# Patient Record
Sex: MALE | Race: WHITE | Employment: FULL TIME | URBAN - METROPOLITAN AREA
[De-identification: names, ages, dates, MRNs, and addresses within clinical notes are randomized per-mention and may not be internally consistent; named-entity substitution may affect disease eponyms.]

---

## 2018-10-22 ENCOUNTER — HOSPITAL ENCOUNTER (OUTPATIENT)
Dept: MRI IMAGING | Age: 63
Discharge: HOME | End: 2018-10-22
Attending: OTOLARYNGOLOGY
Payer: COMMERCIAL

## 2018-10-22 DIAGNOSIS — H90.5 SENSORINEURAL HEARING LOSS (SNHL) OF LEFT EAR: ICD-10-CM

## 2018-10-22 PROCEDURE — 74011250636 HC RX REV CODE- 250/636: Performed by: OTOLARYNGOLOGY

## 2018-10-22 PROCEDURE — A9575 INJ GADOTERATE MEGLUMI 0.1ML: HCPCS | Performed by: OTOLARYNGOLOGY

## 2018-10-22 PROCEDURE — 70553 MRI BRAIN STEM W/O & W/DYE: CPT

## 2018-10-22 RX ORDER — GADOTERATE MEGLUMINE 376.9 MG/ML
20 INJECTION INTRAVENOUS
Status: COMPLETED | OUTPATIENT
Start: 2018-10-22 | End: 2018-10-22

## 2018-10-22 RX ADMIN — GADOTERATE MEGLUMINE 20 ML: 376.9 INJECTION INTRAVENOUS at 08:07

## 2022-04-28 NOTE — PERIOP NOTES
[facility]  Ambulatory Surgery Unit  Pre-operative Instructions    Surgery/Procedure Date  Tuesday May 3rd            Tentative Arrival Time TBD      1. On the day of your surgery/procedure, please report to the Ambulatory Surgery Unit Registration Desk and sign in at your designated time. The Ambulatory Surgery Unit is located in [facility] on the [facility] side of the [facility] across from the [address] [place] [address]. Please have all of your health insurance cards and a photo ID.    **TWO adults may accompany you the day of the procedure. We have limited seating available. If our waiting room is at capacity, your ride may be asked to remain in their vehicle. No one under 15 is allowed in the waiting room. Masks, fully covering the mouth and nose, are required in the waiting room. 2. You must have someone with you to drive you home, as you should not drive a car for 24 hours following anesthesia. Please make arrangements for a responsible adult friend or family member to stay with you for at least the first 24 hours after your surgery. 3. Do not have anything to eat or drink (including water, gum, mints, coffee, juice) after 11:59 PM  Monday May 2nd. This may not apply to medications prescribed by your physician. (Please note below the special instructions with medications to take the morning of surgery, if applicable.)    4. We recommend you do not drink any alcoholic beverages for 24 hours before and after your surgery. 5. Contact your surgeons office for instructions on the following medications: non-steroidal anti-inflammatory drugs (i.e. Advil, Aleve), vitamins, and supplements. (Some surgeons will want you to stop these medications prior to surgery and others may allow you to take them)   **If you are currently taking Plavix, Coumadin, Aspirin and/or other blood-thinning agents, contact your surgeon for instructions. ** Your surgeon will partner with the physician prescribing these medications to determine if it is safe to stop or if you need to continue taking. Please do not stop taking these medications without instructions from your surgeon. 6. In an effort to help prevent surgical site infection, we ask that you shower with an anti-bacterial soap (i.e. Dial/Safeguard, or the soap provided to you at your preadmission testing appointment) for 3 days prior to and on the morning of surgery, using a fresh towel after each shower. (Please begin this process with fresh bed linens.) Do not apply any lotions, powders, or deodorants after the shower on the day of your procedure. If applicable, please do not shave the operative site for 48 hours prior to surgery. 7. Wear comfortable clothes. Wear glasses instead of contacts. Do not bring any jewelry or money (other than copays or fees as instructed). Do not wear make-up, particularly mascara, the morning of your surgery. Do not wear nail polish, particularly if you are having foot /hand surgery. Wear your hair loose or down, no ponytails, buns, olive pins or clips. All body piercings must be removed. 8. You should understand that if you do not follow these instructions your surgery may be cancelled. If your physical condition changes (i.e. fever, cold or flu) please contact your surgeon as soon as possible. 9. It is important that you be on time. If a situation occurs where you may be late, or if you have any questions or problems, please call [phone].    10. Your surgery time may be subject to change. You will receive a phone call the day prior to surgery to confirm your arrival time. 11. Pediatric patients: please bring a change of clothes, diapers, bottle/sippy cup, pacifier, etc.      Special Instructions:     Take all medications and inhalers, as prescribed, on the morning of surgery with a sip of water EXCEPT: n/a      Insulin Dependent Diabetic patients: Take your diabetic medications as prescribed the day before surgery. Hold all diabetic medications the day of surgery. If you are scheduled to arrive for surgery after 8:00 AM, and your AM blood sugar is >200, please call Ambulatory Surgery. I understand a pre-operative phone call will be made to verify my surgery time. In the event that I am not available, I give permission for a message to be left on my answering service and/or with another person?       Yes    Reviewed instructions via telephone, patient verbalized understanding         ___________________      ___________________      ________________  (Signature of Patient)          (Witness)                   (Date and Time)

## 2022-05-02 ENCOUNTER — ANESTHESIA EVENT (OUTPATIENT)
Dept: SURGERY | Age: 67
End: 2022-05-02
Payer: COMMERCIAL

## 2022-05-02 PROBLEM — S46.012A TRAUMATIC COMPLETE TEAR OF LEFT ROTATOR CUFF: Status: ACTIVE | Noted: 2022-05-02

## 2022-05-02 NOTE — H&P
PRE- OP History and Physical                             Subjective:     Patient is a 77 y.o. male presented with a history of L shoulder pain ongoing 3.5 months after a fall landing on his L shoulder. He was initially seen at Ortho on-call treated with diclofenac and physical therapy. Ronal Aden was re-evaluated by Dr. Lamine Blanton 1 month ago and encouraged to continue with therapy. He states that his pain is still persistent and he locates his pain on his lateral deltoid. He notes of weakness in his L shoulder. He notes of night awakening due to pain. He is nondiabetic, nonsmoker. .  The patient's condition has been resistant to non-operative treatment and is being admitted for surgical management of this condition. Patient Active Problem List    Diagnosis Date Noted    Traumatic complete tear of left rotator cuff 05/02/2022     Past Medical History:   Diagnosis Date    Arthritis     Hypertension       Past Surgical History:   Procedure Laterality Date    HX CATARACT REMOVAL Bilateral     with lens implants    HX CHOLECYSTECTOMY      HX COLONOSCOPY      HX HEART CATHETERIZATION      no stents      Prior to Admission medications    Medication Sig Start Date End Date Taking? Authorizing Provider   aspirin delayed-release 81 mg tablet Take 81 mg by mouth daily. Yes Provider, Historical   valsartan (DIOVAN) 160 mg tablet Take 160 mg by mouth daily. Yes Provider, Historical   cetirizine (ZYRTEC) 10 mg tablet Take 10 mg by mouth daily. Yes Provider, Historical     Allergies   Allergen Reactions    Amoxicillin Rash     And itching      Social History     Tobacco Use    Smoking status: Never Smoker    Smokeless tobacco: Never Used   Substance Use Topics    Alcohol use:  Yes     Alcohol/week: 2.0 standard drinks     Types: 2 Cans of beer per week     Comment: every couple of months      Family History   Problem Relation Age of Onset    Hypertension Mother     Diabetes Father       Review of Systems  A comprehensive review of systems was negative except for that written in the HPI. Objective:     Patient Vitals for the past 8 hrs:   BP Temp Pulse Resp SpO2 Height Weight   05/03/22 1040 128/80 -- 80 17 100 % -- --   05/03/22 1006 (!) 158/79 -- -- -- -- -- --   05/03/22 1005 -- 97.8 °F (36.6 °C) 84 18 100 % 5' 7\" (1.702 m) 91.6 kg (202 lb)     Visit Vitals  /80   Pulse 80   Temp 97.8 °F (36.6 °C)   Resp 17   Ht 5' 7\" (1.702 m)   Wt 91.6 kg (202 lb)   SpO2 100%   BMI 31.64 kg/m²     General:  Alert, cooperative, no distress, appears stated age. Head:  Normocephalic, without obvious abnormality, atraumatic. Eyes:  Conjunctivae/corneas clear. PERRL, EOMs intact. Ears:  Normal TMs and external ear canals both ears. Nose: Nares normal. Septum midline. Mucosa normal. No drainage or sinus tenderness. Throat: Lips, mucosa, and tongue normal. Teeth and gums normal.   Neck: Supple, symmetrical, trachea midline, no adenopathy, thyroid: no enlargement/tenderness/nodules, no carotid bruit and no JVD. Back:   Symmetric, no curvature. ROM normal. No CVA tenderness. Lungs:   Clear to auscultation bilaterally. Chest wall:  No tenderness or deformity. Heart:  Regular rate and rhythm, S1, S2, no murmur, click, rub or gallop. Abdomen:   Soft, non-tender. Bowel sounds normal. No masses,  No organomegaly. Extremities: Extremities normal except Previous physical exam of bilateral shoulders shows /160, external rotation 70/70.  Mild painful arc.  Mild positive drop-arm sign.  Positive Kaplan on the left.  He has 4/5 strength on the left compared to 05/05 on the right with forward elevation and external rotation testing.  No AC pain.  Good biceps triceps strength, no biceps deformity. No stability was performed due to MRI already showing complete capsular tear. , atraumatic, no cyanosis or edema. Pulses: 2+ and symmetric all extremities.    Skin: Skin color, texture, turgor normal. No rashes or lesions   Lymph nodes: Cervical, supraclavicular, and axillary nodes normal.   Neurologic: CNII-XII intact. Neurovascular exam intact in distal extremities        Imaging Review          MRI shoulder left without contrast ([id number])     Result Date: 4/21/2022  [facility] MRI INDICATION: Traumatic complete rotator cuff tear, pain COMPARISON: None TECHNIQUE: Axial proton density fat-saturated; oblique coronal T1, T2 fat-saturated, and proton density fat-saturated; and oblique sagittal T2 fat-saturated MRI of the left shoulder. CONTRAST: None. FINDINGS: A.C. joint: Moderate osteoarthritis. Anterior acromion process type: 2 Bone marrow: No acute fracture, dislocation, or marrow replacing process. Scattered subcortical cystic change in the humeral head Joint fluid: Moderate size joint effusion with synovitis. Moderate size subacromial/subdeltoid bursal effusion Rotator cuff tendons: Full-thickness, full width supraspinatus tendon tear extending to the superior subscapularis and infraspinatus tendon fibers with retraction up to the mid humeral head level. Background marked subscapularis, marked supraspinatus, and moderate infraspinatus tendinosis. Biceps tendon: Marked intracapsular tendinosis. Partial tear at the genu. Mild medial subluxation. Muscles: Feathery edema signal within supraspinatus superiorly with contour irregularity, concerning for partial tear Glenoid labrum: Intact. Joint capsule: Humeral avulsion of the inferior glenohumeral ligament. Glenohumeral articular cartilage: Signal heterogeneity. No focal osteochondral lesion Soft tissue mass: None. IMPRESSION: 1.   Full-thickness, full width supraspinatus tendon tear extending to subscapularis and infraspinatus, with retraction. Partial tear of the supraspinatus muscle. 2.   Humeral avulsion of the inferior glenohumeral ligament. 3.   Marked long head biceps tendinosis with partial tear and mild subluxation.  Danny Fatima     I have personally and independently reviewed MRI and the report of L shoulder on 4/21/2022, which reveals full thickness rotator cuff tear of the supraspinatus, subscapularis and infraspinatus with retraction, biceps instability and tearing, and capsular tear the needs to be repaired. Assessment:     Active Problems:    Traumatic complete tear of left rotator cuff (5/2/2022)        Plan:   Nabil Hi has rotator cuff tear and biceps instability on his L shoulder. I discussed the natural history and natural progression of diagnoses. I reviewed the patient's medical record, previous office notes, and  discussed findings, imaging, impression, treatment options, and plan with the patient. Treatment options discussed to include no intervention, prescription strength oral anti-inflammatories, cortisone injections, oral steroids, therapy, MRI, and surgery.      This is an ongoing problem. MRI shows full thickness rotator cuff tear of the supraspinatus to the subscapularis and infraspinatus with retraction. He also has a HAGL lesion which is a traumatic capsular tear so he obviously suffered a dislocation with his fall and will require capsular repair with his cuff repair. Previous conservative treatment of NSAIDs and physical therapy for 6 weeks has failed to provide relief. Surgical intervention was discussed and includes arthroscopic rotator cuff repair 3x, capsular repair, and biceps tenodesis. Pros, cons, success rate and recovery time of surgery was discussed.      After discussion and answering questions, it was elected to proceed with arthroscopic rotator cuff repair 3x, capsular repair, and biceps tenodesis. Handout on surgery was filled. Follow up for surgery. Operative and non-operative treatments have been discussed with the patient including risks and benefits of each. After consideration of risks, benefits limitations to the consented procedures and alternative options for treatment, the patient has consented to surgical interventions. Questions were answered and Pre-op teaching was completed.       COREY Alexandra

## 2022-05-03 ENCOUNTER — HOSPITAL ENCOUNTER (OUTPATIENT)
Age: 67
Setting detail: OUTPATIENT SURGERY
Discharge: HOME | End: 2022-05-03
Attending: ORTHOPAEDIC SURGERY | Admitting: ORTHOPAEDIC SURGERY
Payer: COMMERCIAL

## 2022-05-03 ENCOUNTER — ANESTHESIA (OUTPATIENT)
Dept: SURGERY | Age: 67
End: 2022-05-03
Payer: COMMERCIAL

## 2022-05-03 VITALS
DIASTOLIC BLOOD PRESSURE: 72 MMHG | SYSTOLIC BLOOD PRESSURE: 123 MMHG | HEART RATE: 85 BPM | OXYGEN SATURATION: 94 % | TEMPERATURE: 98.3 F | WEIGHT: 202 LBS | BODY MASS INDEX: 31.71 KG/M2 | HEIGHT: 67 IN | RESPIRATION RATE: 19 BRPM

## 2022-05-03 DIAGNOSIS — S46.012A TRAUMATIC COMPLETE TEAR OF LEFT ROTATOR CUFF, INITIAL ENCOUNTER: Primary | ICD-10-CM

## 2022-05-03 PROCEDURE — 77030010509 HC AIRWY LMA MSK TELE -A: Performed by: NURSE ANESTHETIST, CERTIFIED REGISTERED

## 2022-05-03 PROCEDURE — 74011250636 HC RX REV CODE- 250/636: Performed by: ORTHOPAEDIC SURGERY

## 2022-05-03 PROCEDURE — 77030002922 HC SUT FBRWRE ARTH -B: Performed by: ORTHOPAEDIC SURGERY

## 2022-05-03 PROCEDURE — C1713 ANCHOR/SCREW BN/BN,TIS/BN: HCPCS | Performed by: ORTHOPAEDIC SURGERY

## 2022-05-03 PROCEDURE — 77030018834: Performed by: ORTHOPAEDIC SURGERY

## 2022-05-03 PROCEDURE — 77030012711 HC WND ARTHRO ABLT S&N -D: Performed by: ORTHOPAEDIC SURGERY

## 2022-05-03 PROCEDURE — 77030008496 HC TBNG ARTHSC IRR S&N -B: Performed by: ORTHOPAEDIC SURGERY

## 2022-05-03 PROCEDURE — 74011000250 HC RX REV CODE- 250: Performed by: NURSE ANESTHETIST, CERTIFIED REGISTERED

## 2022-05-03 PROCEDURE — 77030037837: Performed by: ORTHOPAEDIC SURGERY

## 2022-05-03 PROCEDURE — 76030000003 HC AMB SURG OR TIME 1.5 TO 2: Performed by: ORTHOPAEDIC SURGERY

## 2022-05-03 PROCEDURE — 74011250636 HC RX REV CODE- 250/636: Performed by: ANESTHESIOLOGY

## 2022-05-03 PROCEDURE — 77030019908 HC STETH ESOPH SIMS -A: Performed by: NURSE ANESTHETIST, CERTIFIED REGISTERED

## 2022-05-03 PROCEDURE — 74011000250 HC RX REV CODE- 250: Performed by: ORTHOPAEDIC SURGERY

## 2022-05-03 PROCEDURE — 77030003598 HC NDL MULT/FIRE ARTH -C: Performed by: ORTHOPAEDIC SURGERY

## 2022-05-03 PROCEDURE — 77030013079 HC BLNKT BAIR HGGR 3M -A: Performed by: NURSE ANESTHETIST, CERTIFIED REGISTERED

## 2022-05-03 PROCEDURE — 77030002916 HC SUT ETHLN J&J -A: Performed by: ORTHOPAEDIC SURGERY

## 2022-05-03 PROCEDURE — 76210000050 HC AMBSU PH II REC 0.5 TO 1 HR: Performed by: ORTHOPAEDIC SURGERY

## 2022-05-03 PROCEDURE — 77030034038 HC BLD SHV DYON ACRMNZR S&N -B: Performed by: ORTHOPAEDIC SURGERY

## 2022-05-03 PROCEDURE — 77030004451 HC BUR SHV S&N -B: Performed by: ORTHOPAEDIC SURGERY

## 2022-05-03 PROCEDURE — 76060000063 HC AMB SURG ANES 1.5 TO 2 HR: Performed by: ORTHOPAEDIC SURGERY

## 2022-05-03 PROCEDURE — 76210000040 HC AMBSU PH I REC FIRST 0.5 HR: Performed by: ORTHOPAEDIC SURGERY

## 2022-05-03 PROCEDURE — 2709999900 HC NON-CHARGEABLE SUPPLY: Performed by: ORTHOPAEDIC SURGERY

## 2022-05-03 PROCEDURE — 77030002966 HC SUT PDS J&J -A: Performed by: ORTHOPAEDIC SURGERY

## 2022-05-03 PROCEDURE — 74011250636 HC RX REV CODE- 250/636: Performed by: NURSE ANESTHETIST, CERTIFIED REGISTERED

## 2022-05-03 DEVICE — ANCHOR SUTURE SFT 2.6 MM TRPL LD FIBERWIRE CL FIBERTAK: Type: IMPLANTABLE DEVICE | Site: SHOULDER | Status: FUNCTIONAL

## 2022-05-03 RX ORDER — SODIUM CHLORIDE 0.9 % (FLUSH) 0.9 %
5-40 SYRINGE (ML) INJECTION AS NEEDED
Status: DISCONTINUED | OUTPATIENT
Start: 2022-05-03 | End: 2022-05-03 | Stop reason: HOSPADM

## 2022-05-03 RX ORDER — GABAPENTIN 300 MG/1
300 CAPSULE ORAL
Qty: 3 CAPSULE | Refills: 0 | Status: SHIPPED | OUTPATIENT
Start: 2022-05-03

## 2022-05-03 RX ORDER — DIPHENHYDRAMINE HYDROCHLORIDE 50 MG/ML
12.5 INJECTION, SOLUTION INTRAMUSCULAR; INTRAVENOUS AS NEEDED
Status: DISCONTINUED | OUTPATIENT
Start: 2022-05-03 | End: 2022-05-03 | Stop reason: HOSPADM

## 2022-05-03 RX ORDER — PROPOFOL 10 MG/ML
INJECTION, EMULSION INTRAVENOUS AS NEEDED
Status: DISCONTINUED | OUTPATIENT
Start: 2022-05-03 | End: 2022-05-03 | Stop reason: HOSPADM

## 2022-05-03 RX ORDER — OXYCODONE HYDROCHLORIDE 5 MG/1
5 TABLET ORAL
Qty: 30 TABLET | Refills: 0 | Status: SHIPPED | OUTPATIENT
Start: 2022-05-03 | End: 2022-05-08

## 2022-05-03 RX ORDER — LIDOCAINE HYDROCHLORIDE 10 MG/ML
0.1 INJECTION, SOLUTION EPIDURAL; INFILTRATION; INTRACAUDAL; PERINEURAL AS NEEDED
Status: DISCONTINUED | OUTPATIENT
Start: 2022-05-03 | End: 2022-05-03 | Stop reason: HOSPADM

## 2022-05-03 RX ORDER — MIDAZOLAM HYDROCHLORIDE 1 MG/ML
INJECTION, SOLUTION INTRAMUSCULAR; INTRAVENOUS
Status: COMPLETED
Start: 2022-05-03 | End: 2022-05-03

## 2022-05-03 RX ORDER — HYDROMORPHONE HYDROCHLORIDE 1 MG/ML
.2-.5 INJECTION, SOLUTION INTRAMUSCULAR; INTRAVENOUS; SUBCUTANEOUS ONCE
Status: DISCONTINUED | OUTPATIENT
Start: 2022-05-03 | End: 2022-05-03 | Stop reason: HOSPADM

## 2022-05-03 RX ORDER — MORPHINE SULFATE 10 MG/ML
2 INJECTION, SOLUTION INTRAMUSCULAR; INTRAVENOUS
Status: DISCONTINUED | OUTPATIENT
Start: 2022-05-03 | End: 2022-05-03 | Stop reason: HOSPADM

## 2022-05-03 RX ORDER — ROPIVACAINE HYDROCHLORIDE 5 MG/ML
INJECTION, SOLUTION EPIDURAL; INFILTRATION; PERINEURAL
Status: COMPLETED
Start: 2022-05-03 | End: 2022-05-03

## 2022-05-03 RX ORDER — SODIUM CHLORIDE, SODIUM LACTATE, POTASSIUM CHLORIDE, CALCIUM CHLORIDE 600; 310; 30; 20 MG/100ML; MG/100ML; MG/100ML; MG/100ML
25 INJECTION, SOLUTION INTRAVENOUS CONTINUOUS
Status: DISCONTINUED | OUTPATIENT
Start: 2022-05-03 | End: 2022-05-03 | Stop reason: HOSPADM

## 2022-05-03 RX ORDER — LIDOCAINE HYDROCHLORIDE 20 MG/ML
INJECTION, SOLUTION EPIDURAL; INFILTRATION; INTRACAUDAL; PERINEURAL AS NEEDED
Status: DISCONTINUED | OUTPATIENT
Start: 2022-05-03 | End: 2022-05-03 | Stop reason: HOSPADM

## 2022-05-03 RX ORDER — ROPIVACAINE HYDROCHLORIDE 5 MG/ML
INJECTION, SOLUTION EPIDURAL; INFILTRATION; PERINEURAL
Status: COMPLETED | OUTPATIENT
Start: 2022-05-03 | End: 2022-05-03

## 2022-05-03 RX ORDER — PHENYLEPHRINE HCL IN 0.9% NACL 0.4MG/10ML
SYRINGE (ML) INTRAVENOUS AS NEEDED
Status: DISCONTINUED | OUTPATIENT
Start: 2022-05-03 | End: 2022-05-03 | Stop reason: HOSPADM

## 2022-05-03 RX ORDER — DEXAMETHASONE SODIUM PHOSPHATE 4 MG/ML
INJECTION, SOLUTION INTRA-ARTICULAR; INTRALESIONAL; INTRAMUSCULAR; INTRAVENOUS; SOFT TISSUE AS NEEDED
Status: DISCONTINUED | OUTPATIENT
Start: 2022-05-03 | End: 2022-05-03 | Stop reason: HOSPADM

## 2022-05-03 RX ORDER — KETOROLAC TROMETHAMINE 10 MG/1
10 TABLET, FILM COATED ORAL EVERY 6 HOURS
Qty: 12 TABLET | Refills: 0 | Status: SHIPPED | OUTPATIENT
Start: 2022-05-03 | End: 2022-05-06

## 2022-05-03 RX ORDER — FENTANYL CITRATE 50 UG/ML
25 INJECTION, SOLUTION INTRAMUSCULAR; INTRAVENOUS
Status: DISCONTINUED | OUTPATIENT
Start: 2022-05-03 | End: 2022-05-03 | Stop reason: HOSPADM

## 2022-05-03 RX ORDER — MIDAZOLAM HYDROCHLORIDE 1 MG/ML
INJECTION, SOLUTION INTRAMUSCULAR; INTRAVENOUS AS NEEDED
Status: DISCONTINUED | OUTPATIENT
Start: 2022-05-03 | End: 2022-05-03 | Stop reason: HOSPADM

## 2022-05-03 RX ORDER — SODIUM CHLORIDE 0.9 % (FLUSH) 0.9 %
5-40 SYRINGE (ML) INJECTION EVERY 8 HOURS
Status: DISCONTINUED | OUTPATIENT
Start: 2022-05-03 | End: 2022-05-03 | Stop reason: HOSPADM

## 2022-05-03 RX ORDER — EPHEDRINE SULFATE/0.9% NACL/PF 50 MG/5 ML
SYRINGE (ML) INTRAVENOUS AS NEEDED
Status: DISCONTINUED | OUTPATIENT
Start: 2022-05-03 | End: 2022-05-03 | Stop reason: HOSPADM

## 2022-05-03 RX ORDER — ONDANSETRON 2 MG/ML
INJECTION INTRAMUSCULAR; INTRAVENOUS AS NEEDED
Status: DISCONTINUED | OUTPATIENT
Start: 2022-05-03 | End: 2022-05-03 | Stop reason: HOSPADM

## 2022-05-03 RX ORDER — OXYCODONE AND ACETAMINOPHEN 5; 325 MG/1; MG/1
1 TABLET ORAL
Status: DISCONTINUED | OUTPATIENT
Start: 2022-05-03 | End: 2022-05-03 | Stop reason: HOSPADM

## 2022-05-03 RX ORDER — DROPERIDOL 2.5 MG/ML
0.62 INJECTION, SOLUTION INTRAMUSCULAR; INTRAVENOUS AS NEEDED
Status: DISCONTINUED | OUTPATIENT
Start: 2022-05-03 | End: 2022-05-03 | Stop reason: HOSPADM

## 2022-05-03 RX ORDER — FENTANYL CITRATE 50 UG/ML
INJECTION, SOLUTION INTRAMUSCULAR; INTRAVENOUS AS NEEDED
Status: DISCONTINUED | OUTPATIENT
Start: 2022-05-03 | End: 2022-05-03 | Stop reason: HOSPADM

## 2022-05-03 RX ORDER — ONDANSETRON 4 MG/1
4 TABLET, FILM COATED ORAL
Qty: 10 TABLET | Refills: 1 | Status: SHIPPED | OUTPATIENT
Start: 2022-05-03

## 2022-05-03 RX ADMIN — SODIUM CHLORIDE, POTASSIUM CHLORIDE, SODIUM LACTATE AND CALCIUM CHLORIDE 25 ML/HR: 600; 310; 30; 20 INJECTION, SOLUTION INTRAVENOUS at 10:12

## 2022-05-03 RX ADMIN — PROPOFOL 160 MG: 10 INJECTION, EMULSION INTRAVENOUS at 11:00

## 2022-05-03 RX ADMIN — FENTANYL CITRATE 25 MCG: 50 INJECTION, SOLUTION INTRAMUSCULAR; INTRAVENOUS at 12:16

## 2022-05-03 RX ADMIN — DEXAMETHASONE SODIUM PHOSPHATE 4 MG: 4 INJECTION, SOLUTION INTRAMUSCULAR; INTRAVENOUS at 11:10

## 2022-05-03 RX ADMIN — Medication 40 MCG: at 11:17

## 2022-05-03 RX ADMIN — ROPIVACAINE HYDROCHLORIDE 30 ML: 5 INJECTION, SOLUTION EPIDURAL; INFILTRATION; PERINEURAL at 11:38

## 2022-05-03 RX ADMIN — Medication 10 MG: at 11:11

## 2022-05-03 RX ADMIN — WATER 2 G: 1 INJECTION INTRAMUSCULAR; INTRAVENOUS; SUBCUTANEOUS at 11:05

## 2022-05-03 RX ADMIN — Medication 10 MG: at 11:23

## 2022-05-03 RX ADMIN — Medication 40 MCG: at 11:21

## 2022-05-03 RX ADMIN — Medication 80 MCG: at 11:29

## 2022-05-03 RX ADMIN — ONDANSETRON HYDROCHLORIDE 4 MG: 2 INJECTION, SOLUTION INTRAMUSCULAR; INTRAVENOUS at 11:10

## 2022-05-03 RX ADMIN — Medication 80 MCG: at 11:34

## 2022-05-03 RX ADMIN — FENTANYL CITRATE 25 MCG: 50 INJECTION, SOLUTION INTRAMUSCULAR; INTRAVENOUS at 12:36

## 2022-05-03 RX ADMIN — MIDAZOLAM HYDROCHLORIDE 3 MG: 1 INJECTION, SOLUTION INTRAMUSCULAR; INTRAVENOUS at 11:32

## 2022-05-03 RX ADMIN — SODIUM CHLORIDE 40 MCG/MIN: 900 INJECTION, SOLUTION INTRAVENOUS at 11:31

## 2022-05-03 RX ADMIN — LIDOCAINE HYDROCHLORIDE 60 MG: 20 INJECTION, SOLUTION EPIDURAL; INFILTRATION; INTRACAUDAL; PERINEURAL at 11:00

## 2022-05-03 RX ADMIN — FENTANYL CITRATE 50 MCG: 50 INJECTION, SOLUTION INTRAMUSCULAR; INTRAVENOUS at 11:00

## 2022-05-03 RX ADMIN — Medication 80 MCG: at 11:31

## 2022-05-03 RX ADMIN — SODIUM CHLORIDE, POTASSIUM CHLORIDE, SODIUM LACTATE AND CALCIUM CHLORIDE: 600; 310; 30; 20 INJECTION, SOLUTION INTRAVENOUS at 12:50

## 2022-05-03 RX ADMIN — Medication 80 MCG: at 11:23

## 2022-05-03 NOTE — PERIOP NOTES
Bebo Hall  [birth year]  [id number]    Situation:  Verbal report given from: Jack Flores RN and Roge Cruz, MOHINDER  Procedure: Procedure(s):  LEFT SHOULDER ARTHROSCOPY, ROTATOR CUFF REPAIR, BICEPS TENODESIS    Background:    Preoperative diagnosis: LEFT ROTATOR CUFF TEAR    Postoperative diagnosis: LEFT ROTATOR CUFF TEAR    :  Dr. Radha Cesar    Assistant(s): Circ-1: Gabriel Pringle RN  Physician Assistant: COREY Yeung  Scrub Tech-1: Leidy MARTINEZ, RT    Specimens: * No specimens in log *    Assessment:  Intra-procedure medications         Anesthesia gave intra-procedure sedation and medications, see anesthesia flow sheet     Intravenous fluids: LR@ KVO     Vital signs stable       Recommendation:    Permission to share finding with wife Roberta Bird

## 2022-05-03 NOTE — PERIOP NOTES
Patient received to PACU, VSS. Patient drowsy but arouses to voice. Dressing to left shoulder intact. 1302: Patient awake tolerating liquids, patient rates pain 0/10. Discharge instructions given. Patient and wife Cari Prather verbalize understanding of instructions and follow up appointment. 1340: Patient assisted to restroom to void without issue. Patient and wife state ready for discharge. IV removed. Sling applied. Patient discharged at this time by wheelchair with belongings, wife to provide transportation home.

## 2022-05-03 NOTE — OP NOTES
Name: Jeanette Venegas MRN: [id number]    : 1955    Surgery Date: 5/3/2022     Date of Dictation: 5/3/2022    Admitting DX: LEFT ROTATOR CUFF TEAR    Pre-OP DX:  Left rotator cuff tear x3, biceps instabiity, HAGL lesion    Post OP DX: same    Procedure: Left shoulder arthroscopic Rotator cuff tear x3 supra, infra, subscap, biceps tenodesis    Surgeon: Fernandez Patino M.D. Asst: CECELIA Neri Complications: none    Blood Loss: Minimal    Implants: Arthrex anchores    Antibiotics: Weight appropriate IV Ancef    Specimens: none    Anesthesia: GET with Interscalene Block    Indication: This patient has a symptomatic rotator cuff tear that comes in for repair. Patient has failed nonsurgical treatment. This is a complex surgical procedure requiring an assistant for patient positioning, for wound closure, but critically for what is a 4 handed operation requiring the assistant to hold the arthroscope while anchors are placed while sutures are thrown through the rotator cuff while knots are tied, for cannula stabilization and for suture management and an assistant is used. Procedure:  Patient is brought into the operative room theater and rolled over into the left side up position, beanbag X of plated, downside axillary roll placed, downside peroneal nerve padded, neck placed in neutral extension position the shoulders then prepped and draped in put in 10 lb of traction and 30 degrees of abduction and 20 degrees of forward flexion. After time out anterior and posterior arthroscopic portals were established in the glenohumeral joint was viewed in its entirety. The findings were as follows. Articular cartilage normal. Labrum normal. Biceps torn and medially unstable due to subscap tear, tenotomized and tenodesed to transverse bicipital ligament with luggage tag suture technique, proximal stump removed. Epifanio Johnson Rotator Cuff full thickness 3 tendon tear    We then left the glenohumeral space and entered the subacromial space. The rotator cuff tear was visualized, mobilized, the edges were freshened, the underlying tuberosity was roughened. The characteristics of this tear are good cuff quality, everything looks acute. This rotator cuff repair was repaired using a two  row technique with Arthrex anchors, sutures placed using a scorpion through a lateral passport. 6 triple and double loaded anchors were used. All knots were tied using over over under post switch over post which under knot tying technique using a knot tyer while my assistant held the scope. NO SAD needed. The hagl was fixed during repair of subscap by grabbing both lateral tendon and capsule with those sutures. Photographs were taken, the joint was copiously irrigated, portals were closed and a sterile dressing applied, patient taken to the recovery room in a sling in stable condition.   End dictation

## 2022-05-03 NOTE — DISCHARGE INSTRUCTIONS
Yohana Styles M.D. Magdi Muñoz PA-C  Knee & Shoulder Surgery  Sports Medicine  [phone]     Rotator Cuff Repair Post-Operative Instructions      Activity Restrictions:  No lifting your operative arm. You may extend and bend your elbow and you may also move your wrist. You may perform minimal arm dangle exercises for personal hygiene. _X__ You had a long head biceps tendon repair. Avoid supination activities such as screwdrivers, door knobs, or using a can opener for the next 6 weeks. Pain Control: We manage post-operative pain with a combination of tools including the nerve block, ice, Tylenol, Toradol, Gabapentin and pain medicine. We encourage you to use Tylenol, 1000 mg every 6 hours, only for the first 5 days. After that use only 500 mg every 6 hours. You will use Toradol 10mg every 6 hours for the first 3 days only. Gabapentin 300mg is used only at night for the first 3 nights. We prescribe pain medicine, typically oxycodone unless you have a known adverse reaction and you will use this as needed, mostly the first 3 days and generally no more than 1 week. Sling: You will use your sling for 5 weeks. You may remove your arm from the sling for showers. You may also remove your arm from the sling and let your arm hang down so your elbow doesnt get stiff. You are encouraged to perform hand, wrist and elbow range of motion exercises, as well as shoulder blade pinches, 4-5 times per day. These exercises will help decrease swelling and stiffness in the elbow and wrist. No dangle exercises until 2 weeks after surgery. The essential point is that you are NOT allowed to lift your elbow away from your side under its own power for the first 5 weeks. Dressing: Your dressing will be left in place for 48 hours from your surgery time. You may notice bloody drainage on the dressing.   That is normal.  You will remove the dressing 2 days after surgery and cover the incisions with circular band-aids. Shower with band-aids on, then remove and replace the band-aids after showers. Sutures will be removed at your first post-op appointment about 1 week after surgery. Swelling:  Swelling of the biceps, forearm, and hand is very common after surgery. You may even notice some bruising or discoloration of skin. This is normal.  You may use a stress ball and by performing motion of the wrist and elbow the swelling will dissipate. For severe swelling we may arrange for hand therapy if needed. Sleeping:   Patients are generally more comfortable sleeping in a reclining chair or in bed with pillows propped behind the shoulder. You can wear your sling when sleeping, or you may remove the sling and just slide a bed pillow underneath your arm and sleep like that. Some difficulty sleeping is common for 2-3 weeks after surgery. Therapy:    A PT prescription is typically provided in your post-op packet. We will discuss when to start therapy at your first post-op appointment. Your physical therapist will progress your activity appropriately according to our post-op protocol. Medications: You should resume your daily medication for other medical conditions the day after surgery. You will go home with a pain medication prescription, typically Oxycodone unless you have an allergy to this medicine. If you have an adverse reaction to the pain medicine call [phone] or after business hours [phone]. DO NOT wait until you are in a lot of pain before taking the medication. It takes the medication 30-45 minutes to take effect. When using Tylenol or acetaminophen for the first 5 days you may use 1000 mg every 6 hours. After the first 5 days reduce to 500 mg every 6 hours. The use of narcotics can lead to constipation. A high fiber diet and lots of fluids can prevent this occurring.   Over the counter laxatives can be used as directed on the label. We recommend taking both Miralax and Pericolace to help with constipation. If a refill of medication is needed, please call the office during regular business hours, Monday through Friday 8:00 a.m. to 5:00 p.m. Dr. Bereket Porter may not readily available to sign a prescription so it is important to call ahead. Refills will not be made after hours, so please plan ahead. We also cannot guarantee a same day prescription. Severe Pain Dosage Schedule: If you are experiencing severe pain, you may follow this dosage schedule. Example for Severe Pain  12:00 pm Take 2 tablets of narcotic pain medicine, also take Tylenol 1000mg Toradol 10mg   1:00 pm  Take 1 more pain tablet only if in severe pain   2:00 pm  Take 1 more pain tablet only if in severe pain   4:00 pm  Repeat pain medicine as prescribed   6:00 pm  Tylenol 1000mg   8:00 pm  Repeat pain medicine as prescribed, Take Gabapentin 300mg    Severe pain medicine dosing ONLY for the FIRST 24-48 hours    Itching:  Itching/rash is a common post op complaint. It can be caused from many different perioperative causes including but not limited to pain medicine, adhesive tapes, and surgical skin preparations. If you experience moderate to severe itching we recommend using over-the-counter oral Zyrtec (cetrizine) and/or Benadryl as directed on the package. Driving: DO NOT drive with narcotics in your system. You may begin driving 1 week after surgery. It is OK to drive in your sling, just follow the same rules. No lifting your elbow away from your side. Return to school/work: This depends on your job requirements and level of activity. If you work at a desk job or in a supervisory role, you may return as early as 3-4 days after surgery. Average return to physical labor is 4-6 months post-op. Follow Up: You will be given an appointment card for your follow up appointment at our [facility].   At that time your sutures will be removed and physical therapy will be arranged if necessary. IF PROBLEMS ARISE, PLEASE CALL THE OFFICE AT [phone]         DO NOT TAKE TYLENOL/ACETAMINOPHEN WITH PERCOCET, LORTAB, [address] OR VICODEN. TAKE NARCOTIC PAIN MEDICATIONS WITH FOOD! For the night of surgery, while block is still in effect, start with 1 pain pill at bedtime    Narcotics tend to be constipating and we recommend taking a stool softener such as Colace or Miralax (follow package instructions). If you were given prescriptions, please review the written information on the prescribed medications. DO NOT DRIVE WHILE TAKING NARCOTIC PAIN MEDICATIONS. CPAP PATIENTS BE SURE TO WEAR MACHINE WHENEVER NAPPING OR SLEEPING DAY/NIGHT OF SURGERY! DISCHARGE SUMMARY from Nurse    The following personal items collected during your admission are returned to you:   Dental Appliance: Dental Appliances: None  Vision: Visual Aid: Glasses,With patient (in pocket of pants)  Hearing Aid:    Jewelry: Jewelry: None  Clothing: Clothing: With patient  Other Valuables: Other Valuables: With patient,Eyeglasses,Other (comment) (Bilat hearing aids and glasses in pocket of pants)  Valuables sent to safe:        PATIENT INSTRUCTIONS:    After General Anesthesia or Intravenous Sedation, for 24 hours or while taking prescription Narcotics:  · Someone should be with you for the next 24 hours. · For your own safety, a responsible adult must drive you home. · Limit your activities  · Recommended activity: Rest today, up with assistance today. Do not climb stairs or shower unattended for the next 24 hours. · Start with a soft bland diet and advance as tolerated (no nausea) to regular diet. · If you have a sore throat some things that may help are: fluids, warm salt water gargle, or throat lozenges. If this does not improve after several days please follow up with your family physician.   · Do not drive and operate hazardous machinery  · Do not make important personal or business decisions  · Do  not drink alcoholic beverages  · If you have not urinated within 8 hours after discharge, please contact your surgeon on call. Report the following to your surgeon:  · Excessive pain, swelling, redness or odor of or around the surgical area  · Temperature over 100.5  · Nausea and vomiting lasting longer than 4 hours or if unable to take medications  · Any signs of decreased circulation or nerve impairment to extremity: change in color, persistent  numbness, tingling, coldness or increase pain    · If you received an upper extremity nerve block, please wear your sling until the block has worn off, then refer to your surgeons post-operative instructions. If you have had a shoulder block or a block near your collar bone, you may have              symptoms such as:          1. Mild shortness of breath        2. A hoarse voice        3. Blurry vision        4. Unequal pupils        5. Drooping of your face on the same side as the nerve block. These symptoms will disappear as the nerve block wears off. · You will receive a Post Operative Call from one of the Recovery Room Nurses on the day after your surgery to check on you. It is very important for us to know how you are recovering after your surgery. If you have an issue please call your surgeon, do not wait for the post operative call. · You may receive an e-mail or letter in the mail from [employer] regarding your experience with us in the Ambulatory Surgery Unit. Your feedback is valuable to us and we appreciate your participation in the survey. ·   · If the above instructions are not adequate or you are having problems after your surgery, call your physician at their office number. ·   · We wish you a speedy recovery ? What to do at Home:    *  Please give a list of your current medications to your Primary Care Provider.     *  Please update this list whenever your medications are discontinued, doses are      changed, or new medications (including over-the-counter products) are added. *  Please carry medication information at all times in case of emergency situations. If you have not had your influenza or pneumococcal vaccines, please follow up with your primary care physician. The discharge information has been reviewed with the patient and caregiver. The patient and caregiver verbalized understanding.

## 2022-05-03 NOTE — PERIOP NOTES
Dr. Ivan Varma performed a LUE block with ultrasound. Patient on continuous cardiac and pulse oximetry monitoring throughout the procedure, nasal cannula 3 liters. Patient received 3 mg, Versed, IV, administered by Dr. Jessica Sims. Vital signs stable. Patient tolerated procedure well. Patient drowsy but arouses when spoken to. Will continue to monitor.

## 2022-05-03 NOTE — PERIOP NOTES
Permission received to review discharge instructions and discuss private health information with wife, Ricki Andersen. Patient states that wife will be with them for at least 24 hours following today's procedure. Mistral-Air warming blanket applied at this time. Set to appropriate setting that is comfortable to patient. Will continue to monitor.

## 2022-05-03 NOTE — ANESTHESIA POSTPROCEDURE EVALUATION
Procedure(s):  LEFT SHOULDER ARTHROSCOPY, ROTATOR CUFF REPAIR, BICEPS TENODESIS. general, regional    Anesthesia Post Evaluation      Multimodal analgesia: multimodal analgesia used between 6 hours prior to anesthesia start to PACU discharge  Patient location during evaluation: PACU  Patient participation: complete - patient participated  Level of consciousness: awake and alert  Pain score: 0  Airway patency: patent  Anesthetic complications: no  Cardiovascular status: acceptable  Respiratory status: acceptable  Hydration status: acceptable  Comments: Pt has interscalene block. Sling postop.   Post anesthesia nausea and vomiting:  none  Final Post Anesthesia Temperature Assessment:  Normothermia (36.0-37.5 degrees C)      INITIAL Post-op Vital signs:   Vitals Value Taken Time   /69 05/03/22 1300   Temp 36.8 °C (98.3 °F) 05/03/22 1300   Pulse 78 05/03/22 1300   Resp 12 05/03/22 1300   SpO2 94 % 05/03/22 1300

## 2022-05-03 NOTE — ANESTHESIA PREPROCEDURE EVALUATION
Relevant Problems   No relevant active problems       Anesthetic History   No history of anesthetic complications            Review of Systems / Medical History  Patient summary reviewed, nursing notes reviewed and pertinent labs reviewed    Pulmonary  Within defined limits                 Neuro/Psych   Within defined limits           Cardiovascular    Hypertension              Exercise tolerance: >4 METS  Comments: Cath 2013 negative   GI/Hepatic/Renal  Within defined limits              Endo/Other        Arthritis     Other Findings   Comments: Left rotator cuff tear         Physical Exam    Airway  Mallampati: I  TM Distance: > 6 cm  Neck ROM: normal range of motion   Mouth opening: Normal     Cardiovascular    Rhythm: regular  Rate: normal         Dental  No notable dental hx       Pulmonary  Breath sounds clear to auscultation               Abdominal  GI exam deferred       Other Findings            Anesthetic Plan    ASA: 2  Anesthesia type: general and regional - interscalene block      Post-op pain plan if not by surgeon: peripheral nerve block single    Induction: Intravenous  Anesthetic plan and risks discussed with: Patient

## 2022-05-03 NOTE — ANESTHESIA PROCEDURE NOTES
Peripheral Block    Start time: 5/3/2022 11:29 AM  End time: 5/3/2022 11:42 AM  Performed by: Chetan Mcgovern MD  Authorized by: Chetan Mcgovern MD       Pre-procedure: Indications: at surgeon's request and post-op pain management    Preanesthetic Checklist: patient identified, risks and benefits discussed, site marked, timeout performed, anesthesia consent given and patient being monitored    Timeout Time: 11:31 EDT          Block Type:   Block Type:   Interscalene  Laterality:  Left  Monitoring:  Standard ASA monitoring, responsive to questions and oxygen  Injection Technique:  Single shot  Procedures: ultrasound guided    Patient Position: supine  Prep: chlorhexidine    Location:  Interscalene  Needle Type:  Stimuplex  Needle Gauge:  22 G  Needle Localization:  Ultrasound guidance  Medication Injected:  Ropivacaine (PF) (NAROPIN)(0.5%) 5 mg/mL injection, 30 mL  Med Admin Time: 5/3/2022 11:38 AM    Assessment:  Number of attempts:  1  Injection Assessment:  Incremental injection every 5 mL, no paresthesia, ultrasound image on chart, local visualized surrounding nerve on ultrasound, negative aspiration for blood and no intravascular symptoms  Patient tolerance:  Patient tolerated the procedure well with no immediate complications

## 2022-05-03 NOTE — BRIEF OP NOTE
Brief Postoperative Note    Patient: Jignesh Jackson  YOB: 1955  MRN: [id number]    Date of Procedure: 5/3/2022     Pre-Op Diagnosis: LEFT ROTATOR CUFF TEAR    Post-Op Diagnosis: Same as preoperative diagnosis. Procedure(s):  LEFT SHOULDER ARTHROSCOPY, ROTATOR CUFF REPAIR, BICEPS TENODESIS    Surgeon(s):  Maribel Harvey MD    Surgical Assistant: Physician Assistant: COREY Francis    Anesthesia: General     Estimated Blood Loss (mL): Minimal    Complications: None    Specimens: * No specimens in log *     Implants:   Implant Name Type Inv.  Item Serial No.  Lot No. LRB No. Used Action   Arthrex Fiber Jd Suture [place], Double Loaded [place]  N/A ARTHREX [id number] Left 3 Implanted   ANCHOR SUTURE SFT 2.6 MM TRPL LD FIBERWIRE CL FIBERTAK - SN/A [place] ANCHOR SUTURE SFT 2.6 MM TRPL LD Deirdre Ratliff N/A ARTHInspro [id number] Left 1 Implanted   ANCHOR SUTURE SFT 2.6 MM TRPL LD FIBERWIRE CL FIBERTAK - SN/A [place] ANCHOR SUTURE SFT 2.6 MM TRPL LD Carrington Dick CelletraWD [id number] Left 2 Implanted       Drains: * No LDAs found *    Findings: see op note    Electronically Signed by COREY Pagan on 5/3/2022 at 12:45 PM

## 2024-02-20 ENCOUNTER — HOSPITAL ENCOUNTER (OUTPATIENT)
Facility: HOSPITAL | Age: 69
Discharge: HOME | End: 2024-02-23
Attending: ORTHOPAEDIC SURGERY
Payer: MEDICARE

## 2024-02-20 DIAGNOSIS — M75.111 NONTRAUMATIC INCOMPLETE TEAR OF ROTATOR CUFF, RIGHT: ICD-10-CM

## 2024-02-20 PROCEDURE — 73221 MRI JOINT UPR EXTREM W/O DYE: CPT

## 2024-03-18 RX ORDER — HYDROCHLOROTHIAZIDE 12.5 MG/1
12.5 TABLET ORAL EVERY MORNING
COMMUNITY

## 2024-03-18 RX ORDER — AMLODIPINE BESYLATE 10 MG/1
10 TABLET ORAL EVERY MORNING
COMMUNITY

## 2024-03-18 RX ORDER — CYANOCOBALAMIN (VITAMIN B-12) 500 MCG
1 TABLET ORAL EVERY MORNING
COMMUNITY

## 2024-03-18 RX ORDER — FUROSEMIDE 20 MG/1
20 TABLET ORAL DAILY PRN
COMMUNITY
Start: 2023-03-28 | End: 2024-03-27

## 2024-03-18 RX ORDER — IBUPROFEN 200 MG
200 TABLET ORAL EVERY 6 HOURS PRN
COMMUNITY

## 2024-03-18 RX ORDER — OMEPRAZOLE 40 MG/1
40 CAPSULE, DELAYED RELEASE ORAL PRN
COMMUNITY
Start: 2023-01-17

## 2024-03-18 RX ORDER — SENNOSIDES 8.6 MG
1300 CAPSULE ORAL EVERY 8 HOURS PRN
COMMUNITY

## 2024-03-18 RX ORDER — MELOXICAM 15 MG/1
15 TABLET ORAL AS NEEDED
COMMUNITY
Start: 2021-05-04

## 2024-03-18 RX ORDER — LOSARTAN POTASSIUM 100 MG/1
100 TABLET ORAL EVERY MORNING
COMMUNITY
Start: 2021-03-02

## 2024-03-18 NOTE — PERIOP NOTE
Patient scored LORETTA score of 5, patient stated he would not go for testing and would not wear a machine and does not want to be checked for sleep apnea.

## 2024-03-18 NOTE — PERIOP NOTE
[facility]  Ambulatory Surgery Unit  [address] [address]  [address]  Pre-operative Instructions    Surgery/Procedure Date  Friday 3/22            Tentative Arrival Time TBD      1. On the day of your surgery/procedure, please report to the Ambulatory Surgery Unit Registration Desk and sign in at your designated time. The Ambulatory Surgery Unit is located in [facility] on the [facility] side of the [facility] across from the [facility]. Please have all of your health insurance cards, co-payment, and a photo ID.    **TWO adults may accompany you the day of the procedure.  We have limited seating available.  If our waiting room is at capacity, your ride may be asked to remain in their vehicle.  No one under 15 is allowed in the waiting room.      2. You must have someone stay here during the whole procedure, and able to drive you home, as you should not drive a car for 24 hours following anesthesia. Please make arrangements for a responsible adult friend or family member to stay with you for at least the first 24 hours after your surgery.    3. Do not have anything to eat or drink (including water, gum, mints, coffee, juice) after 11:59 PM on Thursday 3/21. This may not apply to medications prescribed by your physician.  (Please note below the special instructions with medications to take the morning of surgery, if applicable.)    4. We recommend you do not drink any alcoholic beverages for 24 hours before and after your surgery.    5. Contact your surgeon’s office for instructions on the following medications: non-steroidal anti-inflammatory drugs (i.e. Advil, Aleve), vitamins, and supplements. (Some surgeon’s will want you to stop these medications prior to surgery and others may allow you to take them)   **If you are currently taking Plavix, Coumadin, Aspirin and/or other blood-thinning agents, contact your surgeon for instructions.** Your surgeon will partner  prescribed the day before surgery.  Hold all diabetic medications the day of surgery.    If you are scheduled to arrive for surgery after 8:00 AM, and your AM blood sugar is >200, please call Ambulatory Surgery.    I understand a pre-operative phone call will be made to verify my surgery time.  In the event that I am not available, I give permission for a message to be left on my answering service and/or with another person?      Yes    Reviewed instructions via telephone, pt verbalized understanding          ___________________      ___________________      ________________  (Signature of Patient)          (Witness)                   (Date and Time)

## 2024-03-21 ENCOUNTER — ANESTHESIA EVENT (OUTPATIENT)
Facility: HOSPITAL | Age: 69
End: 2024-03-21
Payer: MEDICARE

## 2024-03-21 NOTE — H&P
Patient was seen and examined. There have been no significant clinical changes since the completion of the above History and Physical. Patient identified by surgeon; surgical site was confirmed by patient and surgeon. See separate heart and lung evaluation performed and documented by anesthesia.    CARE TEAM:  Patient Care Team:  Luis Enrique Soria MD as PCP - General (Pediatrics)     ASSESSMENT:  1. Traumatic complete tear of right rotator cuff, initial encounter          Impression: Right complete tear of the supraspinatus, partial tears of the subscapularis and infraspinatus, biceps instability     PLAN:  I reviewed Mr. Willett's right shoulder MRI which shows a complete tear of the supraspinatus, partial tears of the subscapularis and infraspinatus, and biceps instability. We discussed operative and non-operative options. Details of an arthroscopic rotator cuff repair were discussed, including primary risks of re-tear and post-operative stiffness, with a healing time of 6 months. Risks of re-tear are higher in smokers, diabetics, ages over 60, large or chronic tears, and noncompliance with restrictions. Large chronic retracted tears may be un-repairable. Details of a biceps tenodesis were also discussed. Patient understands and agrees to proceed with the repairs.           Treatment Plan:       Orders Placed This Encounter    BP Patient Education            HISTORY OF PRESENT ILLNESS:  Chief Complaint: Follow-up of the Right Shoulder   Age: 68 y.o.  Sex: male   Hand-dominance: right      History of present illness: Mr. Willett presents today for evaluation of a right shoulder MRI. He has had several years of atraumatic right shoulder pain which worsened a few months ago. The pain is worse with overhead movements. He had a left RC repair 2 years ago.         OBJECTIVE:  Constitutional:  No acute distress. His body mass index is 31.95 kg/m².   Eyes:  Sclera are nonicteric.  Respiratory:  No labored

## 2024-03-22 ENCOUNTER — HOSPITAL ENCOUNTER (OUTPATIENT)
Facility: HOSPITAL | Age: 69
Setting detail: OUTPATIENT SURGERY
Discharge: HOME | End: 2024-03-22
Attending: ORTHOPAEDIC SURGERY | Admitting: ORTHOPAEDIC SURGERY
Payer: MEDICARE

## 2024-03-22 ENCOUNTER — ANESTHESIA (OUTPATIENT)
Facility: HOSPITAL | Age: 69
End: 2024-03-22
Payer: MEDICARE

## 2024-03-22 VITALS
RESPIRATION RATE: 16 BRPM | SYSTOLIC BLOOD PRESSURE: 126 MMHG | HEART RATE: 84 BPM | DIASTOLIC BLOOD PRESSURE: 68 MMHG | WEIGHT: 216 LBS | HEIGHT: 67 IN | BODY MASS INDEX: 33.9 KG/M2 | TEMPERATURE: 97.2 F | OXYGEN SATURATION: 94 %

## 2024-03-22 DIAGNOSIS — S46.012A TRAUMATIC COMPLETE TEAR OF LEFT ROTATOR CUFF, INITIAL ENCOUNTER: Primary | ICD-10-CM

## 2024-03-22 PROCEDURE — 64415 NJX AA&/STRD BRCH PLXS IMG: CPT | Performed by: ANESTHESIOLOGY

## 2024-03-22 PROCEDURE — 3700000000 HC ANESTHESIA ATTENDED CARE: Performed by: ORTHOPAEDIC SURGERY

## 2024-03-22 PROCEDURE — 2500000003 HC RX 250 WO HCPCS

## 2024-03-22 PROCEDURE — 2580000003 HC RX 258: Performed by: ANESTHESIOLOGY

## 2024-03-22 PROCEDURE — 2709999900 HC NON-CHARGEABLE SUPPLY: Performed by: ORTHOPAEDIC SURGERY

## 2024-03-22 PROCEDURE — 7100000010 HC PHASE II RECOVERY - FIRST 15 MIN: Performed by: ORTHOPAEDIC SURGERY

## 2024-03-22 PROCEDURE — 2720000010 HC SURG SUPPLY STERILE: Performed by: ORTHOPAEDIC SURGERY

## 2024-03-22 PROCEDURE — 3600000004 HC SURGERY LEVEL 4 BASE: Performed by: ORTHOPAEDIC SURGERY

## 2024-03-22 PROCEDURE — 6360000002 HC RX W HCPCS

## 2024-03-22 PROCEDURE — 3700000001 HC ADD 15 MINUTES (ANESTHESIA): Performed by: ORTHOPAEDIC SURGERY

## 2024-03-22 PROCEDURE — 2580000003 HC RX 258

## 2024-03-22 PROCEDURE — C1713 ANCHOR/SCREW BN/BN,TIS/BN: HCPCS | Performed by: ORTHOPAEDIC SURGERY

## 2024-03-22 PROCEDURE — 7100000011 HC PHASE II RECOVERY - ADDTL 15 MIN: Performed by: ORTHOPAEDIC SURGERY

## 2024-03-22 PROCEDURE — 6360000002 HC RX W HCPCS: Performed by: ORTHOPAEDIC SURGERY

## 2024-03-22 PROCEDURE — 2500000003 HC RX 250 WO HCPCS: Performed by: ORTHOPAEDIC SURGERY

## 2024-03-22 PROCEDURE — 7100000001 HC PACU RECOVERY - ADDTL 15 MIN: Performed by: ORTHOPAEDIC SURGERY

## 2024-03-22 PROCEDURE — 6370000000 HC RX 637 (ALT 250 FOR IP)

## 2024-03-22 PROCEDURE — 7100000000 HC PACU RECOVERY - FIRST 15 MIN: Performed by: ORTHOPAEDIC SURGERY

## 2024-03-22 PROCEDURE — 2580000003 HC RX 258: Performed by: ORTHOPAEDIC SURGERY

## 2024-03-22 PROCEDURE — 6360000002 HC RX W HCPCS: Performed by: ANESTHESIOLOGY

## 2024-03-22 PROCEDURE — 3600000014 HC SURGERY LEVEL 4 ADDTL 15MIN: Performed by: ORTHOPAEDIC SURGERY

## 2024-03-22 DEVICE — FIBERTAK RC, DOUBLOAD TAPE BL/W, BLK/W
Type: IMPLANTABLE DEVICE | Site: SHOULDER | Status: FUNCTIONAL
Brand: ARTHREX®

## 2024-03-22 RX ORDER — FENTANYL CITRATE 50 UG/ML
INJECTION, SOLUTION INTRAMUSCULAR; INTRAVENOUS PRN
Status: DISCONTINUED | OUTPATIENT
Start: 2024-03-22 | End: 2024-03-22 | Stop reason: SDUPTHER

## 2024-03-22 RX ORDER — SUCCINYLCHOLINE CHLORIDE 20 MG/ML
INJECTION INTRAMUSCULAR; INTRAVENOUS PRN
Status: DISCONTINUED | OUTPATIENT
Start: 2024-03-22 | End: 2024-03-22 | Stop reason: SDUPTHER

## 2024-03-22 RX ORDER — SODIUM CHLORIDE 9 MG/ML
INJECTION, SOLUTION INTRAVENOUS PRN
Status: DISCONTINUED | OUTPATIENT
Start: 2024-03-22 | End: 2024-03-22 | Stop reason: HOSPADM

## 2024-03-22 RX ORDER — ROCURONIUM BROMIDE 10 MG/ML
INJECTION, SOLUTION INTRAVENOUS PRN
Status: DISCONTINUED | OUTPATIENT
Start: 2024-03-22 | End: 2024-03-22 | Stop reason: SDUPTHER

## 2024-03-22 RX ORDER — SODIUM CHLORIDE 0.9 % (FLUSH) 0.9 %
5-40 SYRINGE (ML) INJECTION PRN
Status: DISCONTINUED | OUTPATIENT
Start: 2024-03-22 | End: 2024-03-22 | Stop reason: HOSPADM

## 2024-03-22 RX ORDER — LIDOCAINE HYDROCHLORIDE 20 MG/ML
INJECTION, SOLUTION EPIDURAL; INFILTRATION; INTRACAUDAL; PERINEURAL PRN
Status: DISCONTINUED | OUTPATIENT
Start: 2024-03-22 | End: 2024-03-22 | Stop reason: SDUPTHER

## 2024-03-22 RX ORDER — SODIUM CHLORIDE, SODIUM LACTATE, POTASSIUM CHLORIDE, CALCIUM CHLORIDE 600; 310; 30; 20 MG/100ML; MG/100ML; MG/100ML; MG/100ML
INJECTION, SOLUTION INTRAVENOUS CONTINUOUS
Status: DISCONTINUED | OUTPATIENT
Start: 2024-03-22 | End: 2024-03-22 | Stop reason: HOSPADM

## 2024-03-22 RX ORDER — ROPIVACAINE HYDROCHLORIDE 5 MG/ML
INJECTION, SOLUTION EPIDURAL; INFILTRATION; PERINEURAL
Status: COMPLETED | OUTPATIENT
Start: 2024-03-22 | End: 2024-03-22

## 2024-03-22 RX ORDER — DIPHENHYDRAMINE HYDROCHLORIDE 50 MG/ML
12.5 INJECTION INTRAMUSCULAR; INTRAVENOUS
Status: DISCONTINUED | OUTPATIENT
Start: 2024-03-22 | End: 2024-03-22 | Stop reason: HOSPADM

## 2024-03-22 RX ORDER — NALOXONE HYDROCHLORIDE 0.4 MG/ML
INJECTION, SOLUTION INTRAMUSCULAR; INTRAVENOUS; SUBCUTANEOUS PRN
Status: DISCONTINUED | OUTPATIENT
Start: 2024-03-22 | End: 2024-03-22 | Stop reason: HOSPADM

## 2024-03-22 RX ORDER — ROPIVACAINE HYDROCHLORIDE 5 MG/ML
INJECTION, SOLUTION EPIDURAL; INFILTRATION; PERINEURAL
Status: COMPLETED
Start: 2024-03-22 | End: 2024-03-22

## 2024-03-22 RX ORDER — ONDANSETRON 2 MG/ML
INJECTION INTRAMUSCULAR; INTRAVENOUS PRN
Status: DISCONTINUED | OUTPATIENT
Start: 2024-03-22 | End: 2024-03-22 | Stop reason: SDUPTHER

## 2024-03-22 RX ORDER — KETOROLAC TROMETHAMINE 30 MG/ML
15 INJECTION, SOLUTION INTRAMUSCULAR; INTRAVENOUS
Status: DISCONTINUED | OUTPATIENT
Start: 2024-03-22 | End: 2024-03-22 | Stop reason: HOSPADM

## 2024-03-22 RX ORDER — EPHEDRINE SULFATE/0.9% NACL/PF 50 MG/5 ML
SYRINGE (ML) INTRAVENOUS PRN
Status: DISCONTINUED | OUTPATIENT
Start: 2024-03-22 | End: 2024-03-22 | Stop reason: SDUPTHER

## 2024-03-22 RX ORDER — ACETAMINOPHEN 500 MG
1000 TABLET ORAL ONCE
Status: COMPLETED | OUTPATIENT
Start: 2024-03-22 | End: 2024-03-22

## 2024-03-22 RX ORDER — MEPERIDINE HYDROCHLORIDE 25 MG/ML
12.5 INJECTION INTRAMUSCULAR; INTRAVENOUS; SUBCUTANEOUS EVERY 5 MIN PRN
Status: DISCONTINUED | OUTPATIENT
Start: 2024-03-22 | End: 2024-03-22 | Stop reason: HOSPADM

## 2024-03-22 RX ORDER — ACETAMINOPHEN 500 MG
TABLET ORAL
Status: COMPLETED
Start: 2024-03-22 | End: 2024-03-22

## 2024-03-22 RX ORDER — DROPERIDOL 2.5 MG/ML
0.62 INJECTION, SOLUTION INTRAMUSCULAR; INTRAVENOUS
Status: DISCONTINUED | OUTPATIENT
Start: 2024-03-22 | End: 2024-03-22 | Stop reason: HOSPADM

## 2024-03-22 RX ORDER — FENTANYL CITRATE 50 UG/ML
25 INJECTION, SOLUTION INTRAMUSCULAR; INTRAVENOUS EVERY 5 MIN PRN
Status: DISCONTINUED | OUTPATIENT
Start: 2024-03-22 | End: 2024-03-22 | Stop reason: HOSPADM

## 2024-03-22 RX ORDER — LIDOCAINE HYDROCHLORIDE 10 MG/ML
1 INJECTION, SOLUTION EPIDURAL; INFILTRATION; INTRACAUDAL; PERINEURAL
Status: DISCONTINUED | OUTPATIENT
Start: 2024-03-22 | End: 2024-03-22 | Stop reason: HOSPADM

## 2024-03-22 RX ORDER — KETOROLAC TROMETHAMINE 30 MG/ML
INJECTION, SOLUTION INTRAMUSCULAR; INTRAVENOUS
Status: COMPLETED
Start: 2024-03-22 | End: 2024-03-22

## 2024-03-22 RX ORDER — EPHEDRINE SULFATE/0.9% NACL/PF 25 MG/5 ML
SYRINGE (ML) INTRAVENOUS PRN
Status: DISCONTINUED | OUTPATIENT
Start: 2024-03-22 | End: 2024-03-22 | Stop reason: SDUPTHER

## 2024-03-22 RX ORDER — HYDROMORPHONE HYDROCHLORIDE 2 MG/1
2 TABLET ORAL EVERY 4 HOURS PRN
Qty: 25 TABLET | Refills: 0 | Status: SHIPPED | OUTPATIENT
Start: 2024-03-22 | End: 2024-03-27

## 2024-03-22 RX ORDER — CEFAZOLIN SODIUM 1 G/3ML
INJECTION, POWDER, FOR SOLUTION INTRAMUSCULAR; INTRAVENOUS
Status: DISCONTINUED
Start: 2024-03-22 | End: 2024-03-22 | Stop reason: HOSPADM

## 2024-03-22 RX ORDER — DEXAMETHASONE SODIUM PHOSPHATE 4 MG/ML
INJECTION, SOLUTION INTRA-ARTICULAR; INTRALESIONAL; INTRAMUSCULAR; INTRAVENOUS; SOFT TISSUE PRN
Status: DISCONTINUED | OUTPATIENT
Start: 2024-03-22 | End: 2024-03-22 | Stop reason: SDUPTHER

## 2024-03-22 RX ORDER — WATER 10 ML/10ML
INJECTION INTRAMUSCULAR; INTRAVENOUS; SUBCUTANEOUS
Status: DISCONTINUED
Start: 2024-03-22 | End: 2024-03-22 | Stop reason: HOSPADM

## 2024-03-22 RX ORDER — MIDAZOLAM HYDROCHLORIDE 1 MG/ML
INJECTION INTRAMUSCULAR; INTRAVENOUS
Status: COMPLETED
Start: 2024-03-22 | End: 2024-03-22

## 2024-03-22 RX ORDER — KETOROLAC TROMETHAMINE 30 MG/ML
30 INJECTION, SOLUTION INTRAMUSCULAR; INTRAVENOUS ONCE
Status: COMPLETED | OUTPATIENT
Start: 2024-03-22 | End: 2024-03-22

## 2024-03-22 RX ORDER — MIDAZOLAM HYDROCHLORIDE 1 MG/ML
INJECTION INTRAMUSCULAR; INTRAVENOUS
Status: COMPLETED | OUTPATIENT
Start: 2024-03-22 | End: 2024-03-22

## 2024-03-22 RX ORDER — ONDANSETRON 4 MG/1
4 TABLET, ORALLY DISINTEGRATING ORAL 3 TIMES DAILY PRN
Qty: 20 TABLET | Refills: 0 | Status: SHIPPED | OUTPATIENT
Start: 2024-03-22

## 2024-03-22 RX ORDER — PHENYLEPHRINE HCL IN 0.9% NACL 0.4MG/10ML
SYRINGE (ML) INTRAVENOUS PRN
Status: DISCONTINUED | OUTPATIENT
Start: 2024-03-22 | End: 2024-03-22 | Stop reason: SDUPTHER

## 2024-03-22 RX ORDER — HYDROMORPHONE HYDROCHLORIDE 1 MG/ML
0.5 INJECTION, SOLUTION INTRAMUSCULAR; INTRAVENOUS; SUBCUTANEOUS EVERY 5 MIN PRN
Status: DISCONTINUED | OUTPATIENT
Start: 2024-03-22 | End: 2024-03-22 | Stop reason: HOSPADM

## 2024-03-22 RX ADMIN — SODIUM CHLORIDE, POTASSIUM CHLORIDE, SODIUM LACTATE AND CALCIUM CHLORIDE: 600; 310; 30; 20 INJECTION, SOLUTION INTRAVENOUS at 06:38

## 2024-03-22 RX ADMIN — PHENYLEPHRINE HYDROCHLORIDE 20 MCG/MIN: 10 INJECTION INTRAVENOUS at 07:41

## 2024-03-22 RX ADMIN — WATER 2000 MG: 1 INJECTION INTRAMUSCULAR; INTRAVENOUS; SUBCUTANEOUS at 07:37

## 2024-03-22 RX ADMIN — DEXAMETHASONE SODIUM PHOSPHATE 8 MG: 4 INJECTION, SOLUTION INTRAMUSCULAR; INTRAVENOUS at 07:37

## 2024-03-22 RX ADMIN — FENTANYL CITRATE 50 MCG: 50 INJECTION, SOLUTION INTRAMUSCULAR; INTRAVENOUS at 07:47

## 2024-03-22 RX ADMIN — SUCCINYLCHOLINE CHLORIDE 120 MG: 20 INJECTION, SOLUTION INTRAMUSCULAR; INTRAVENOUS at 07:28

## 2024-03-22 RX ADMIN — Medication 120 MCG: at 07:43

## 2024-03-22 RX ADMIN — ACETAMINOPHEN 1000 MG: 500 TABLET ORAL at 09:13

## 2024-03-22 RX ADMIN — Medication 40 MCG: at 07:41

## 2024-03-22 RX ADMIN — ONDANSETRON HYDROCHLORIDE 4 MG: 2 INJECTION, SOLUTION INTRAMUSCULAR; INTRAVENOUS at 07:37

## 2024-03-22 RX ADMIN — ROPIVACAINE HYDROCHLORIDE 30 ML: 5 INJECTION, SOLUTION EPIDURAL; INFILTRATION; PERINEURAL at 07:10

## 2024-03-22 RX ADMIN — FENTANYL CITRATE 50 MCG: 50 INJECTION, SOLUTION INTRAMUSCULAR; INTRAVENOUS at 07:27

## 2024-03-22 RX ADMIN — Medication 1000 MG: at 09:13

## 2024-03-22 RX ADMIN — EPHEDRINE SULFATE 5 MG: 5 INJECTION INTRAVENOUS at 08:15

## 2024-03-22 RX ADMIN — Medication 5 MG: at 07:54

## 2024-03-22 RX ADMIN — Medication 80 MCG: at 07:31

## 2024-03-22 RX ADMIN — KETOROLAC TROMETHAMINE 30 MG: 30 INJECTION, SOLUTION INTRAMUSCULAR at 09:12

## 2024-03-22 RX ADMIN — ROCURONIUM BROMIDE 5 MG: 10 INJECTION INTRAVENOUS at 07:28

## 2024-03-22 RX ADMIN — Medication 5 MG: at 07:44

## 2024-03-22 RX ADMIN — Medication 40 MCG: at 07:57

## 2024-03-22 RX ADMIN — Medication 40 MCG: at 07:44

## 2024-03-22 RX ADMIN — LIDOCAINE HYDROCHLORIDE 100 MG: 20 INJECTION, SOLUTION EPIDURAL; INFILTRATION; INTRACAUDAL; PERINEURAL at 07:27

## 2024-03-22 RX ADMIN — Medication 40 MCG: at 08:15

## 2024-03-22 RX ADMIN — PROPOFOL 200 MG: 10 INJECTION, EMULSION INTRAVENOUS at 07:27

## 2024-03-22 RX ADMIN — Medication 5 MG: at 07:43

## 2024-03-22 RX ADMIN — KETOROLAC TROMETHAMINE 30 MG: 30 INJECTION, SOLUTION INTRAMUSCULAR; INTRAVENOUS at 09:12

## 2024-03-22 RX ADMIN — Medication 40 MCG: at 07:36

## 2024-03-22 RX ADMIN — MIDAZOLAM HYDROCHLORIDE 3 MG: 1 INJECTION, SOLUTION INTRAMUSCULAR; INTRAVENOUS at 07:06

## 2024-03-22 ASSESSMENT — PAIN DESCRIPTION - ORIENTATION
ORIENTATION: RIGHT
ORIENTATION: RIGHT

## 2024-03-22 ASSESSMENT — PAIN SCALES - GENERAL
PAINLEVEL_OUTOF10: 6
PAINLEVEL_OUTOF10: 6

## 2024-03-22 ASSESSMENT — PAIN DESCRIPTION - LOCATION
LOCATION: SHOULDER
LOCATION: SHOULDER

## 2024-03-22 ASSESSMENT — PAIN - FUNCTIONAL ASSESSMENT: PAIN_FUNCTIONAL_ASSESSMENT: NONE - DENIES PAIN

## 2024-03-22 ASSESSMENT — PAIN DESCRIPTION - DESCRIPTORS
DESCRIPTORS: ACHING
DESCRIPTORS: ACHING

## 2024-03-22 NOTE — PERIOP NOTE
Permission received to review discharge instructions and discuss private health information with wife Nettie.    Patient states family/friend will be with them for 24 hours following procedure.      0713: Dr. Cortez performed a right upper extremity nerve block with ultrasound. Patient on continuous cardiac and pulse oximetry monitoring throughout the procedure, nasal cannula 2 liters. Patient received 3mg Versed. Vital signs stable. Patient tolerated procedure well. Patient drowsy but arouses when spoken to. Will continue to monitor.

## 2024-03-22 NOTE — ANESTHESIA PROCEDURE NOTES
Peripheral Block    Patient location during procedure: pre-op  Reason for block: post-op pain management and at surgeon's request  Start time: 3/22/2024 7:06 AM  End time: 3/22/2024 7:12 AM  Staffing  Performed: anesthesiologist   Anesthesiologist: Imani Cortez MD  Performed by: Imani Cortez MD  Authorized by: Imani Cortez MD    Preanesthetic Checklist  Completed: patient identified, IV checked, site marked, risks and benefits discussed, surgical/procedural consents, equipment checked, pre-op evaluation, timeout performed, anesthesia consent given, oxygen available, monitors applied/VS acknowledged, fire risk safety assessment completed and verbalized and blood product R/B/A discussed and consented  Peripheral Block   Patient position: sitting  Prep: ChloraPrep  Provider prep: mask and sterile gloves  Patient monitoring: cardiac monitor, continuous pulse ox, frequent blood pressure checks, IV access, oxygen and responsive to questions  Block type: Brachial plexus  Interscalene  Laterality: right  Injection technique: single-shot  Guidance: ultrasound guided    Needle   Needle type: insulated echogenic nerve stimulator needle   Needle gauge: 22 G  Needle localization: ultrasound guidance  Needle length: 5 cm  Assessment   Injection assessment: negative aspiration for heme, no paresthesia on injection, local visualized surrounding nerve on ultrasound and no intravascular symptoms  Paresthesia pain: none  Slow fractionated injection: yes  Hemodynamics: stable  Outcomes: uncomplicated and patient tolerated procedure well    Medications Administered  midazolam (VERSED) injection 2 mg/2mL - IntraVENous   3 mg - 3/22/2024 7:06:00 AM  ropivacaine (NAROPIN) injection 0.5% - Perineural, Shoulder Right   30 mL - 3/22/2024 7:10:00 AM

## 2024-03-22 NOTE — ANESTHESIA PRE PROCEDURE
every morning. 5/3/22   Automatic Reconciliation, Ar       Current medications:    Current Facility-Administered Medications   Medication Dose Route Frequency Provider Last Rate Last Admin   • ceFAZolin (ANCEF) 2,000 mg in sterile water 20 mL IV syringe  2,000 mg IntraVENous Once Rajiv Whittaker MD       • lidocaine PF 1 % injection 1 mL  1 mL IntraDERmal Once PRN Imani Cortez MD       • lactated ringers IV soln infusion   IntraVENous Continuous Imani Cortez  mL/hr at 03/22/24 0638 New Bag at 03/22/24 0638   • sodium chloride flush 0.9 % injection 5-40 mL  5-40 mL IntraVENous PRN Imani Cortez MD       • 0.9 % sodium chloride infusion   IntraVENous PRN Imani Cortez MD       • ROPivacaine (NAROPIN) 0.5% injection            • midazolam (VERSED) 5 MG/5ML injection            • ceFAZolin (ANCEF) 1 g injection            • sterile water injection                Allergies:    Allergies   Allergen Reactions   • Amoxicillin Rash     And itching   • Oxycodone Rash       Problem List:    Patient Active Problem List   Diagnosis Code   • Traumatic complete tear of left rotator cuff S46.012A       Past Medical History:        Diagnosis Date   • Arthritis    • GERD (gastroesophageal reflux disease)    • H/O edema     BLE   • Hypertension        Past Surgical History:        Procedure Laterality Date   • CARDIAC CATHETERIZATION      no stents   • CATARACT REMOVAL Bilateral     with lens implants   • CHOLECYSTECTOMY     • COLONOSCOPY     • ROTATOR CUFF REPAIR Left 05/2022       Social History:    Social History     Tobacco Use   • Smoking status: Never   • Smokeless tobacco: Never   Substance Use Topics   • Alcohol use: Yes     Alcohol/week: 2.0 standard drinks of alcohol     Types: 2 Drinks containing 0.5 oz of alcohol per week                                Counseling given: Not Answered      Vital Signs (Current):   Vitals:    03/18/24 1521 03/22/24 0633   BP:  (!) 155/87   Pulse:  84   Resp:  17

## 2024-03-22 NOTE — OP NOTE
Operative Note      Patient: Sim Willett  YOB: 1955  MRN: [id number]    Date of Procedure: 3/22/2024    Pre-Op Diagnosis Codes:     * Traumatic tear of right rotator cuff, unspecified tear extent, initial encounter [S46.011A]    Post-Op Diagnosis:  Right rotator cuff tear subscapularis supraspinatus and infraspinatus with biceps instability       Procedure(s):  RIGHT SHOULDER ARTHROSCOPY, ROTATOR CUFF REPAIR AND OPEN BICEPS REPAIR (GENERAL WITH REGIONAL BLOCK) change open to arthroscopic biceps tenodesis    Surgeon(s):  Rajiv Whittaker MD    Assistant:   Physician Assistant: Brandon Waggoner PA    Anesthesia: General    Estimated Blood Loss (mL): Minimal    Complications: Worn    Specimens:   * No specimens in log *    Implants:  Implant Name Type Inv. Item Serial No.  Lot No. LRB No. Used Action   ANCHOR SUT DIA2.6MM 3 LD W/ 3 1.3MM WHT/BLUE WHT/BLACK AND - [id number]  ANCHOR SUT DIA2.6MM 3 LD W/ 3 1.3MM WHT/BLUE WHT/BLACK AND  [employer] [id number] Right 1 Implanted   2.6 FIBERTAK SUTURE ANCHOR TRIPLE LOADED WITH 1.3MM SUTURETAPE    [employer] [id number] Right 2 Implanted         Drains: * No LDAs found *    Findings: Op note        Detailed Description of Procedure:   Op noteName: Sim Willett    [facility] MRN: [id number]    : 1955    Surgery Date: [unfilled]     Date of Dictation: 3/22/2024    Admitting DX: Traumatic tear of right rotator cuff, unspecified tear extent, initial encounter [S46.011A]    Pre-OP DX: Right shoulder supraspinatus and subscapularis and infraspinatus rotator cuff tears with biceps tearing and instability    Post OP DX: Same    Procedure: Right shoulder arthroscopic rotator cuff repair x 3, arthroscopic long head proximal biceps tenodesis, debridement biceps tendon stump    Surgeon: Rajiv Whittaker M.D.    Asst: Brandon Waggoner PKRYSTAL    Complications: none    Blood Loss: Minimal    Implants: [employer] anchores    Antibiotics: Weight appropriate IV

## 2024-03-22 NOTE — ANESTHESIA POSTPROCEDURE EVALUATION
Department of Anesthesiology  Postprocedure Note    Patient: Sim Willett  MRN: [id number]  YOB: 1955  Date of evaluation: 3/22/2024    Procedure Summary       Date: 03/22/24 Room / Location: [facility] ASU B2 / [facility] AMBULATORY OR    Anesthesia Start: 0724 Anesthesia Stop: 0849    Procedure: RIGHT SHOULDER ARTHROSCOPY, ROTATOR CUFF REPAIR (Right: Shoulder) Diagnosis:       Traumatic tear of right rotator cuff, unspecified tear extent, initial encounter      (Traumatic tear of right rotator cuff, unspecified tear extent, initial encounter [S46.011A])    Surgeons: Rajiv Whittaker MD Responsible Provider: Imani Cortez MD    Anesthesia Type: General, Regional ASA Status: 2            Anesthesia Type: General, Regional    Anamaria Phase I: Anamaria Score: 8    Anamaria Phase II:      Anesthesia Post Evaluation    Patient location during evaluation: PACU  Patient participation: complete - patient participated  Level of consciousness: awake and alert  Nausea & Vomiting: no nausea and no vomiting  Cardiovascular status: hemodynamically stable  Respiratory status: acceptable  Hydration status: euvolemic  Comments: Pt has interscalene block. Complaining of pain in the shoulder, but says it's numb? And cannot move it.. Treating with Toradol  Multimodal analgesia pain management approach  Pain management: satisfactory to patient    No notable events documented.

## 2024-03-22 NOTE — PERIOP NOTE
Sim Willett  1955  [id number]    Situation:  Verbal report given from: JACK Sosa CRNA, RN  Procedure: Procedure(s):  RIGHT SHOULDER ARTHROSCOPY, ROTATOR CUFF REPAIR    Background:    Preoperative diagnosis: Traumatic tear of right rotator cuff, unspecified tear extent, initial encounter [S46.011A]    Postoperative diagnosis: * No post-op diagnosis entered *    :  Dr. Whittaker    Assistant(s): Circulator: Shena Lindsay RN  Relief Circulator: Kevin Yee RN  Scrub Person First: Angélica Wilkinson  Physician Assistant: Brandon Waggoner PA    Specimens: * No specimens in log *    Assessment:  Intra-procedure medications         Anesthesia gave intra-procedure sedation and medications, see anesthesia flow sheet     Intravenous fluids: LR@ KVO     Vital signs stable       Recommendation:

## 2024-03-22 NOTE — BRIEF OP NOTE
Brief Postoperative Note      Patient: Sim Willett  YOB: 1955  MRN: [id number]    Date of Procedure: 3/22/2024    Pre-Op Diagnosis Codes:     * Traumatic tear of right rotator cuff, unspecified tear extent, initial encounter [S46.011A]    Post-Op Diagnosis: Post-Op Diagnosis Codes:     * Traumatic tear of right rotator cuff, unspecified tear extent, initial encounter [S46.011A]       Procedure(s):  RIGHT SHOULDER ARTHROSCOPY, ROTATOR CUFF REPAIR    Surgeon(s):  Rajiv Whittaker MD    Assistant:  Physician Assistant: Brandon Waggoner PA    Anesthesia: General    Estimated Blood Loss (mL): Minimal    Complications: None    Specimens:   * No specimens in log *    Implants:  Implant Name Type Inv. Item Serial No.  Lot No. LRB No. Used Action   ANCHOR SUT DIA2.6MM 3 LD W/ 3 1.3MM WHT/BLUE WHT/BLACK AND - [id number]  ANCHOR SUT DIA2.6MM 3 LD W/ 3 1.3MM WHT/BLUE WHT/BLACK AND  [employer] [id number] Right 1 Implanted   2.6 FIBERTAK SUTURE ANCHOR TRIPLE LOADED WITH 1.3MM SUTURETAPE    [employer] [id number] Right 2 Implanted         Drains: * No LDAs found *    Findings: see op note      Electronically signed by KIM Lopez on 3/22/2024 at 8:37 AM

## 2024-03-22 NOTE — DISCHARGE INSTRUCTIONS
Rajiv Whittaker M.D.  Brandon Waggoner PA-C  Knee & Shoulder Surgery  Sports Medicine  [phone] [facility]  [phone] Direct    >>>You received Tylenol 1000mg prior to your surgery, you may take Tylenol (or pain medication containing Tylenol or Acetaminophen) in 6 hours at 3:15 pm today.<<<    >>>You received Toradol during your surgery. You may not take any form of NSAIDS (non steroidal anti inflammatory drugs) such as Advil, Ibuprofen, Aleve, Motrin until 3:15 pm today.<<<      Rotator Cuff Repair Post-Operative Instructions  Post op Instructional Videos  [email or web address]    Activity Restrictions:  No lifting your operative arm. You may extend and bend your elbow and you may also move your wrist. You may perform minimal arm dangle exercises for personal hygiene.     _X__ You had a long head biceps tendon repair. Avoid supination activities such as screwdrivers, door knobs, or using a can opener for the first 6 weeks.     Pain Control:   We manage post-operative pain with a combination of tools including the nerve block, ice, Tylenol, Toradol, Gabapentin and pain medicine. We encourage you to use Tylenol, 1000 mg every 6 hours, only for the first 5 days.  After that use only 500 mg every 6 hours. You will use Toradol 10mg every 6 hours for the first 3 days only. Gabapentin 300mg is used only at night for the first 3 nights.  We prescribe pain medicine, typically oxycodone unless you have a known adverse reaction and you will use this as needed, mostly the first 3 days and generally no more than 1 week. When using ice we recommend applying a bag of ice 20 minutes on and then 20 minutes off. Do not apply ice directly to the skin as this may cause a burn. Ice is generally helpful the first week and after therapy or home exercise sessions.     Sling:   You will use your sling for 5 weeks.  You may remove your arm from the sling for showers.  You may also remove your arm from the  information on the prescribed medications.    DO NOT DRIVE WHILE TAKING NARCOTIC PAIN MEDICATIONS.    CPAP PATIENTS BE SURE TO WEAR MACHINE WHENEVER NAPPING OR SLEEPING DAY/NIGHT OF SURGERY!    PATIENT INSTRUCTIONS:    After General Anesthesia or Intravenous Sedation, for 24 hours or while taking prescription Narcotics:  Someone should be with you for the next 24 hours.  For your own safety, a responsible adult must drive you home.  Limit your activities  Recommended activity: Rest today, up with assistance today. Do not climb stairs or shower unattended for the next 24 hours.  Start with a soft bland diet and advance as tolerated (no nausea) to regular diet.  If you have a sore throat some things that may help are: fluids, warm salt water gargle, or throat lozenges. If this does not improve after several days please follow up with your family physician.  Do not drive and operate hazardous machinery  Do not make important personal or business decisions  Do  not drink alcoholic beverages  If you have not urinated within 8 hours after discharge, please contact your surgeon on call.      Report the following to your surgeon:  Excessive pain, swelling, redness or odor of or around the surgical area  Temperature over 100.5  Nausea and vomiting lasting longer than 4 hours or if unable to take medications  Any signs of decreased circulation or nerve impairment to extremity: change in color, persistent  numbness, tingling, coldness or increase pain    If you received an upper extremity nerve block, please wear your sling until the block has worn off, then refer to your surgeon’s post-operative instructions.          If you have had a shoulder block or a block near your collar bone, you may have symptoms such as:          1. Mild shortness of breath        2. A hoarse voice        3. Blurry vision        4. Unequal pupils        5. Drooping of your face on the same side as the nerve block.          These symptoms will

## 2024-03-22 NOTE — PERIOP NOTE
Pt tolerating liquids, vss.  Pt verbalized pain on top of shoulder, described as a aching.  Discussed with anesthesia.  Pt medicated with toradol 30mg iv and tylenol 1000 mg po.    0945-D/c instructions reviewed with pt's Wife, all questions answered.  Reviewed when to call the doctor,diet,activity and hygiene.  Reviewed nerve block s/sx, when/what to take for pain, use of ice, use of sling and stool softeners along with ambulation.  Pain is improving.    1000-Pt getting dressed and applying sling    [id number] via w/c to awaiting transportation with his hearing aides and all other belongings.  Demonstrated to pt and wife how to use sling and use of ice.

## 2024-12-03 ENCOUNTER — TRANSCRIBE ORDERS (OUTPATIENT)
Facility: HOSPITAL | Age: 69
End: 2024-12-03

## 2024-12-03 DIAGNOSIS — R97.20 ELEVATED PROSTATE SPECIFIC ANTIGEN (PSA): Primary | ICD-10-CM

## 2024-12-23 ENCOUNTER — HOSPITAL ENCOUNTER (OUTPATIENT)
Facility: HOSPITAL | Age: 69
Discharge: HOME | End: 2024-12-26
Attending: UROLOGY
Payer: MEDICARE

## 2024-12-23 DIAGNOSIS — R97.20 ELEVATED PROSTATE SPECIFIC ANTIGEN (PSA): ICD-10-CM

## 2024-12-23 PROCEDURE — 72197 MRI PELVIS W/O & W/DYE: CPT

## 2024-12-23 PROCEDURE — A9579 GAD-BASE MR CONTRAST NOS,1ML: HCPCS | Performed by: UROLOGY

## 2024-12-23 PROCEDURE — 6360000004 HC RX CONTRAST MEDICATION: Performed by: UROLOGY

## 2024-12-23 RX ADMIN — GADOTERIDOL 20 ML: 279.3 INJECTION, SOLUTION INTRAVENOUS at 10:01

## (undated) DEVICE — SHOULDER SUSPENSION KIT 6 PER BOX

## (undated) DEVICE — SUPER TURBOVAC 90 INTEGRATED CABLE WAND ICW: Brand: COBLATION

## (undated) DEVICE — STERILE POLYISOPRENE POWDER-FREE SURGICAL GLOVES: Brand: PROTEXIS

## (undated) DEVICE — NEEDLE SUT PASS FOR ROT CUF LABRAL REP MULTFI SCORPION

## (undated) DEVICE — SHOULDER ARTHROSCOPY-MRMCASU: Brand: MEDLINE INDUSTRIES, INC.

## (undated) DEVICE — SPONGE GZ W4XL4IN COT 12 PLY TYP VII WVN C FLD DSGN STERILE

## (undated) DEVICE — SOLUTION IRRIG 3000ML LAC RINGERS ARTHROMTC PLAS CONT

## (undated) DEVICE — 4.5 MM FULL RADIUS STRAIGHT                                    BLADES, POWER/EP-1, YELLOW, PACKAGED                                    6 PER BOX, STERILE: Brand: DYONICS

## (undated) DEVICE — SPONGE GZ W4XL4IN COT 12 PLY TYP VII WVN C FLD DSGN

## (undated) DEVICE — ANCHOR SUTURE TRPL LD W/ 3 1.3 MM BLU WHT BLK SUTURE TAPE

## (undated) DEVICE — GLOVE SURG SZ 65 L12IN FNGR THK79MIL GRN LTX FREE

## (undated) DEVICE — 4.0 MM ELITE ACROMIONIZER STRAIGHT                                    DISPOSABLE BURRS, MAUVE, 10000                                    MAXIMUM RPM, PACKAGED 6 PER BOX, STERILE

## (undated) DEVICE — TUBING, SUCTION, 1/4" X 10', STRAIGHT: Brand: MEDLINE

## (undated) DEVICE — SUTURE ETHLN SZ 3-0 L18IN NONABSORBABLE BLK PS-2 L19MM 3/8 1669H

## (undated) DEVICE — CANNULA ARTHSCP L4CM DIA8MM PASSPRT BTTN

## (undated) DEVICE — SUTURE SUTTAPE L40IN DIA1.3MM NONABSORBABLE WHT BLU L26.5MM AR7500

## (undated) DEVICE — GLOVE SURG SZ 6 THK91MIL LTX FREE SYN POLYISOPRENE ANTI

## (undated) DEVICE — GLOVE ORANGE PI 8   MSG9080

## (undated) DEVICE — GLOVE SURG SZ 8 L12IN FNGR THK79MIL GRN LTX FREE

## (undated) DEVICE — DRESSING,GAUZE,XEROFORM,CURAD,1"X8",ST: Brand: CURAD

## (undated) DEVICE — DYONICS 25 INFLOW TUBE SET, 3 PER BOX

## (undated) DEVICE — SUT ETHLN 3-0 18IN PS2 BLK --

## (undated) DEVICE — SUTURE PDS II SZ 0 L36IN ABSRB VLT L36MM CT-1 1/2 CIR Z346H

## (undated) DEVICE — SUTURE FIBERWIRE SZ 2 L38IN NONABSORBABLE BLU WHT BLK AR7201